# Patient Record
Sex: MALE | Race: BLACK OR AFRICAN AMERICAN | Employment: PART TIME | URBAN - METROPOLITAN AREA
[De-identification: names, ages, dates, MRNs, and addresses within clinical notes are randomized per-mention and may not be internally consistent; named-entity substitution may affect disease eponyms.]

---

## 2021-05-13 ENCOUNTER — HOSPITAL ENCOUNTER (EMERGENCY)
Age: 31
Discharge: HOME | End: 2021-05-13
Admitting: EMERGENCY MEDICINE
Payer: COMMERCIAL

## 2021-05-13 ENCOUNTER — APPOINTMENT (OUTPATIENT)
Dept: CT IMAGING | Age: 31
End: 2021-05-13
Attending: NURSE PRACTITIONER
Payer: COMMERCIAL

## 2021-05-13 VITALS
DIASTOLIC BLOOD PRESSURE: 75 MMHG | SYSTOLIC BLOOD PRESSURE: 130 MMHG | BODY MASS INDEX: 35.55 KG/M2 | WEIGHT: 240 LBS | HEIGHT: 69 IN | HEART RATE: 62 BPM | OXYGEN SATURATION: 97 % | RESPIRATION RATE: 16 BRPM | TEMPERATURE: 98.3 F

## 2021-05-13 DIAGNOSIS — L03.311 CELLULITIS OF ABDOMINAL WALL: Primary | ICD-10-CM

## 2021-05-13 LAB
ALBUMIN SERPL-MCNC: 3.9 G/DL (ref 3.5–5)
ALBUMIN/GLOB SERPL: 0.9 {RATIO} (ref 1.1–2.2)
ALP SERPL-CCNC: 128 U/L (ref 45–117)
ALT SERPL-CCNC: 60 U/L (ref 12–78)
ANION GAP SERPL CALC-SCNC: 4 MMOL/L (ref 5–15)
AST SERPL W P-5'-P-CCNC: 28 U/L (ref 15–37)
BASOPHILS # BLD: 0 K/UL (ref 0–0.1)
BASOPHILS NFR BLD: 0 % (ref 0–1)
BILIRUB SERPL-MCNC: 0.8 MG/DL (ref 0.2–1)
BUN SERPL-MCNC: 15 MG/DL (ref 6–20)
BUN/CREAT SERPL: 13 (ref 12–20)
CA-I BLD-MCNC: 9.3 MG/DL (ref 8.5–10.1)
CHLORIDE SERPL-SCNC: 101 MMOL/L (ref 97–108)
CO2 SERPL-SCNC: 30 MMOL/L (ref 21–32)
CREAT SERPL-MCNC: 1.12 MG/DL (ref 0.7–1.3)
DIFFERENTIAL METHOD BLD: NORMAL
EOSINOPHIL # BLD: 0.3 K/UL (ref 0–0.4)
EOSINOPHIL NFR BLD: 5 % (ref 0–7)
ERYTHROCYTE [DISTWIDTH] IN BLOOD BY AUTOMATED COUNT: 12.6 % (ref 11.5–14.5)
GLOBULIN SER CALC-MCNC: 4.4 G/DL (ref 2–4)
GLUCOSE SERPL-MCNC: 126 MG/DL (ref 65–100)
HCT VFR BLD AUTO: 49.4 % (ref 36.6–50.3)
HGB BLD-MCNC: 16.6 G/DL (ref 12.1–17)
IMM GRANULOCYTES # BLD AUTO: 0 K/UL (ref 0–0.04)
IMM GRANULOCYTES NFR BLD AUTO: 0 % (ref 0–0.5)
LYMPHOCYTES # BLD: 2.2 K/UL (ref 0.8–3.5)
LYMPHOCYTES NFR BLD: 33 % (ref 12–49)
MCH RBC QN AUTO: 30.6 PG (ref 26–34)
MCHC RBC AUTO-ENTMCNC: 33.6 G/DL (ref 30–36.5)
MCV RBC AUTO: 91 FL (ref 80–99)
MONOCYTES # BLD: 0.6 K/UL (ref 0–1)
MONOCYTES NFR BLD: 10 % (ref 5–13)
NEUTS SEG # BLD: 3.5 K/UL (ref 1.8–8)
NEUTS SEG NFR BLD: 52 % (ref 32–75)
NRBC # BLD: 0 K/UL (ref 0–0.01)
NRBC BLD-RTO: 0 PER 100 WBC
PLATELET # BLD AUTO: 333 K/UL (ref 150–400)
PMV BLD AUTO: 10 FL (ref 8.9–12.9)
POTASSIUM SERPL-SCNC: 3.6 MMOL/L (ref 3.5–5.1)
PROT SERPL-MCNC: 8.3 G/DL (ref 6.4–8.2)
RBC # BLD AUTO: 5.43 M/UL (ref 4.1–5.7)
SODIUM SERPL-SCNC: 135 MMOL/L (ref 136–145)
WBC # BLD AUTO: 6.6 K/UL (ref 4.1–11.1)

## 2021-05-13 PROCEDURE — 36415 COLL VENOUS BLD VENIPUNCTURE: CPT

## 2021-05-13 PROCEDURE — 74177 CT ABD & PELVIS W/CONTRAST: CPT

## 2021-05-13 PROCEDURE — 74011000636 HC RX REV CODE- 636: Performed by: NURSE PRACTITIONER

## 2021-05-13 PROCEDURE — 80053 COMPREHEN METABOLIC PANEL: CPT

## 2021-05-13 PROCEDURE — 74011250637 HC RX REV CODE- 250/637: Performed by: NURSE PRACTITIONER

## 2021-05-13 PROCEDURE — 85025 COMPLETE CBC W/AUTO DIFF WBC: CPT

## 2021-05-13 PROCEDURE — 99284 EMERGENCY DEPT VISIT MOD MDM: CPT

## 2021-05-13 RX ORDER — SULFAMETHOXAZOLE AND TRIMETHOPRIM 800; 160 MG/1; MG/1
1 TABLET ORAL
Status: COMPLETED | OUTPATIENT
Start: 2021-05-13 | End: 2021-05-13

## 2021-05-13 RX ORDER — SULFAMETHOXAZOLE AND TRIMETHOPRIM 800; 160 MG/1; MG/1
1 TABLET ORAL 2 TIMES DAILY
Qty: 20 TAB | Refills: 0 | Status: SHIPPED | OUTPATIENT
Start: 2021-05-13 | End: 2021-05-23

## 2021-05-13 RX ADMIN — SULFAMETHOXAZOLE AND TRIMETHOPRIM 1 TABLET: 800; 160 TABLET ORAL at 21:36

## 2021-05-13 RX ADMIN — IOPAMIDOL 100 ML: 755 INJECTION, SOLUTION INTRAVENOUS at 20:29

## 2021-05-13 NOTE — ED TRIAGE NOTES
[facility] pt stated that he has been having lower ABD pain;  Hx of hernia repair and ABD cellulitis; denies any n/v/d/c

## 2021-05-13 NOTE — ED PROVIDER NOTES
EMERGENCY DEPARTMENT HISTORY AND PHYSICAL EXAM      Date: 5/13/2021  Patient Name: Adam Alvarado    History of Presenting Illness     Chief Complaint   Patient presents with    Abdominal Pain       History Provided By: Patient    HPI: Adam Alvarado, 35 y.o. male with a past medical history significant for hernia repair presents to the ED with cc of abdominal pain. Patient reports mid abdominal tenderness at the site of his previous hernia repair onset today. He describes the pain as moderate burning-like, 6/10 presently. He is not taking anything for his symptoms and denies needing any medications at this time. He denies any associated fever/chills, N/V/D/C or any  symptoms. He denies any recent heavy lifting or straining. He states he was seen at the [facility] clinic today, the providers attempted to reduce his hernia and were unsuccessful and sent him here for imaging. There are no other complaints, changes, or physical findings at this time. PCP: None    No current facility-administered medications on file prior to encounter. No current outpatient medications on file prior to encounter. Past History     Past Medical History:  No past medical history on file. Past Surgical History:  No past surgical history on file. Family History:  No family history on file. Social History:  Social History     Tobacco Use    Smoking status: Not on file   Substance Use Topics    Alcohol use: Not on file    Drug use: Not on file       Allergies:  No Known Allergies      Review of Systems     Review of Systems   Constitutional: Negative for chills and fever. Gastrointestinal: Positive for abdominal distention and abdominal pain. Negative for constipation, diarrhea, nausea and vomiting. Genitourinary: Negative. Negative for difficulty urinating, dysuria, scrotal swelling and testicular pain. All other systems reviewed and are negative.       Physical Exam     Physical Exam  Vitals signs and nursing note reviewed. Constitutional:       General: He is not in acute distress. Appearance: Normal appearance. Eyes:      Extraocular Movements: Extraocular movements intact. Conjunctiva/sclera: Conjunctivae normal.   Cardiovascular:      Rate and Rhythm: Normal rate and regular rhythm. Heart sounds: Normal heart sounds. Pulmonary:      Effort: Pulmonary effort is normal.      Breath sounds: Normal breath sounds. No wheezing or rales. Abdominal:      General: Bowel sounds are decreased. There is distension. Tenderness: There is abdominal tenderness in the periumbilical area. Hernia: A hernia is present. Hernia is present in the umbilical area. Comments: Distention with tenderness at old surgical scar/hernia, left umbilical   Musculoskeletal: Normal range of motion. Skin:     General: Skin is warm and dry. Neurological:      General: No focal deficit present. Mental Status: He is alert. Psychiatric:         Mood and Affect: Mood normal.         Behavior: Behavior normal. Behavior is cooperative. Lab and Diagnostic Study Results     Labs -  Admission on 05/13/2021, Discharged on 05/13/2021   Component Date Value    WBC 05/13/2021 6.6     RBC 05/13/2021 5.43     HGB 05/13/2021 16.6     HCT 05/13/2021 49.4     MCV 05/13/2021 91.0     MCH 05/13/2021 30.6     MCHC 05/13/2021 33.6     RDW 05/13/2021 12.6     PLATELET 39/66/2016 296     MPV 05/13/2021 10.0     NRBC 05/13/2021 0.0     ABSOLUTE NRBC 05/13/2021 0.00     NEUTROPHILS 05/13/2021 52     LYMPHOCYTES 05/13/2021 33     MONOCYTES 05/13/2021 10     EOSINOPHILS 05/13/2021 5     BASOPHILS 05/13/2021 0     IMMATURE GRANULOCYTES 05/13/2021 0     ABS. NEUTROPHILS 05/13/2021 3.5     ABS. LYMPHOCYTES 05/13/2021 2.2     ABS. MONOCYTES 05/13/2021 0.6     ABS. EOSINOPHILS 05/13/2021 0.3     ABS. BASOPHILS 05/13/2021 0.0     ABS. IMM.  GRANS. 05/13/2021 0.0     DF 05/13/2021 AUTOMATED  Sodium 05/13/2021 135*    Potassium 05/13/2021 3.6     Chloride 05/13/2021 101     CO2 05/13/2021 30     Anion gap 05/13/2021 4*    Glucose 05/13/2021 126*    BUN 05/13/2021 15     Creatinine 05/13/2021 1.12     BUN/Creatinine ratio 05/13/2021 13     GFR est AA 05/13/2021 >60     GFR est non-AA 05/13/2021 >60     Calcium 05/13/2021 9.3     Bilirubin, total 05/13/2021 0.8     AST (SGOT) 05/13/2021 28     ALT (SGPT) 05/13/2021 60     Alk. phosphatase 05/13/2021 128*    Protein, total 05/13/2021 8.3*    Albumin 05/13/2021 3.9     Globulin 05/13/2021 4.4*    A-G Ratio 05/13/2021 0.9*       Radiologic Studies -   CT Results (most recent):  Results from [place] encounter on 05/13/21   CT ABD PELV W CONT    Narrative CT examination of the abdomen and pelvis is performed with IV contrast. The exam  is a volume acquisition from which axial, sagittal and coronal images are  generated. Comparison exams are not available. Findings:Lung bases are normal. The solid abdominal organs reveal a small cyst  in the posterior segment of the right hepatic lobe. There is no free  intraperitoneal fluid or gas. The bowel is unopacified but normal in caliber. There is a normal appendix in the right lower quadrant. No fluid or mass is seen  in the cul-de-sac. There is no abdominal wall hernia. However, the skin and subcutaneous tissues of  the umbilicus devastated abnormal soft tissue attenuation with perhaps mild  enhancement. This area of presumed cellulitis measures 2.4 x 6.5 x 4 cm. No  definite fluid collection is identified. The bone windows are unremarkable. Impression 1. Postsurgical changes with probable cellulitis around the umbilicus with no  definite abscess or fluid collection. 2. No evidence of abdominal hernia. 3. No acute intra-abdominal findings.         Dose reduction: All CT scans at this facility are performed using radiation dose  reduction optimization techniques as appropriate to a performed exam, including  the following: Automated exposure control (AEC), adjustment of the MAA and/or  KUB according to the patient's size, or the patient's use of iterative  reconstruction techniques (ASIR). Medical Decision Making   - I am the first provider for this patient. - I reviewed the vital signs, available nursing notes, past medical history, past surgical history, family history and social history. - Initial assessment performed. The patients presenting problems have been discussed, and they are in agreement with the care plan formulated and outlined with them. I have encouraged them to ask questions as they arise throughout their visit. Vital Signs-Reviewed the patient's vital signs. See chart    Records Reviewed: Nursing Notes    The patient presents with abdominal pain with a differential diagnosis of abdominal pain, diverticulitis, constipation, incarcerated hernia, intra-abdominal infection      ED Course:   Patient presents with abdominal distention and tenderness at umbilical hernia surgical site. Attempted to reduce hernia without success. CT abdomen obtained showing postsurgical changes with cellulitis around the umbilicus, no abscess or fluid collection, no hernia. Patient reports a similar history of the same proximally 1 year ago. Vital signs normalafebrile. Labs unremarkable, no leukocytosis. Will discharge with Bactrim. Patient recommended to follow-up with general surgeon for repeated cellulitis with postsurgical changes. Patient plans to follow-up with his surgeon in [place] when he returns home in approximately 4 weeks. Discussed worrisome reasons to return to the department including worsening symptoms, pain or fever.   ED Course as of May 16 0155   Thu May 13, 2021   2110 CT ABD PELV W CONT [LP]   2115 Patient updated on results    [LP]      ED Course User Index  [LP] Titus Chaudhry NP       Provider Notes (Medical Decision Making): MDM  Number of Diagnoses or Management Options  Cellulitis of abdominal wall: established, worsening     Amount and/or Complexity of Data Reviewed  Clinical lab tests: ordered and reviewed  Tests in the radiology section of CPT®: ordered and reviewed  Discuss the patient with other providers: yes  Independent visualization of images, tracings, or specimens: yes    Risk of Complications, Morbidity, and/or Mortality  Presenting problems: moderate  Diagnostic procedures: moderate  Management options: low    Patient Progress  Patient progress: stable         Disposition   Disposition: Condition stable  DC-The patient was given verbal abdominal pain warning signs and and follow-up instructions  DC- Pain Control DC Home plan: Nonsteroidals, Tylenol, Referral General Surgery and Advised to return for worsening or additional problems such as abdominal or chest pain    Discharged    DISCHARGE PLAN:  1. There are no discharge medications for this patient. 2.   Follow-up Information     Follow up With Specialties Details Why Contact Info    Your surgeon  Schedule an appointment as soon as possible for a visit  for ER follow up         3. Return to ED if worse   4. Discharge Medication List as of 5/13/2021  9:40 PM      START taking these medications    Details   trimethoprim-sulfamethoxazole (Bactrim DS) 160-800 mg per tablet Take 1 Tab by mouth two (2) times a day for 10 days. , Normal, Disp-20 Tab, R-0               Diagnosis     Clinical Impression:   1. Cellulitis of abdominal wall        Attestations:    Heather Moreno, GREGORIO    Please note that this dictation was completed with KOWN, the computer voice recognition software. Quite often unanticipated grammatical, syntax, homophones, and other interpretive errors are inadvertently transcribed by the computer software. Please disregard these errors. Please excuse any errors that have escaped final proofreading. Thank you.

## 2021-05-14 NOTE — DISCHARGE INSTRUCTIONS
Thank you! Thank you for allowing me to care for you in the emergency department. I sincerely hope that you are satisfied with your visit today. It is my goal to provide you with excellent care. Below you will find a list of your labs and imaging from your visit today. Should you have any questions regarding these results please do not hesitate to call the emergency department. Labs -     Recent Results (from the past 12 hour(s))   CBC WITH AUTOMATED DIFF    Collection Time: 05/13/21  7:15 PM   Result Value Ref Range    WBC 6.6 4.1 - 11.1 K/uL    RBC 5.43 4.10 - 5.70 M/uL    HGB 16.6 12.1 - 17.0 g/dL    HCT 49.4 36.6 - 50.3 %    MCV 91.0 80.0 - 99.0 FL    MCH 30.6 26.0 - 34.0 PG    MCHC 33.6 30.0 - 36.5 g/dL    RDW 12.6 11.5 - 14.5 %    PLATELET 022 360 - 107 K/uL    MPV 10.0 8.9 - 12.9 FL    NRBC 0.0 0.0  WBC    ABSOLUTE NRBC 0.00 0.00 - 0.01 K/uL    NEUTROPHILS 52 32 - 75 %    LYMPHOCYTES 33 12 - 49 %    MONOCYTES 10 5 - 13 %    EOSINOPHILS 5 0 - 7 %    BASOPHILS 0 0 - 1 %    IMMATURE GRANULOCYTES 0 0 - 0.5 %    ABS. NEUTROPHILS 3.5 1.8 - 8.0 K/UL    ABS. LYMPHOCYTES 2.2 0.8 - 3.5 K/UL    ABS. MONOCYTES 0.6 0.0 - 1.0 K/UL    ABS. EOSINOPHILS 0.3 0.0 - 0.4 K/UL    ABS. BASOPHILS 0.0 0.0 - 0.1 K/UL    ABS. IMM. GRANS. 0.0 0.00 - 0.04 K/UL    DF AUTOMATED     METABOLIC PANEL, COMPREHENSIVE    Collection Time: 05/13/21  7:15 PM   Result Value Ref Range    Sodium 135 (L) 136 - 145 mmol/L    Potassium 3.6 3.5 - 5.1 mmol/L    Chloride 101 97 - 108 mmol/L    CO2 30 21 - 32 mmol/L    Anion gap 4 (L) 5 - 15 mmol/L    Glucose 126 (H) 65 - 100 mg/dL    BUN 15 6 - 20 mg/dL    Creatinine 1.12 0.70 - 1.30 mg/dL    BUN/Creatinine ratio 13 12 - 20      GFR est AA >60 >60 ml/min/1.73m2    GFR est non-AA >60 >60 ml/min/1.73m2    Calcium 9.3 8.5 - 10.1 mg/dL    Bilirubin, total 0.8 0.2 - 1.0 mg/dL    AST (SGOT) 28 15 - 37 U/L    ALT (SGPT) 60 12 - 78 U/L    Alk.  phosphatase 128 (H) 45 - 117 U/L    Protein, total 8.3 (H) 6.4 - 8.2 g/dL    Albumin 3.9 3.5 - 5.0 g/dL    Globulin 4.4 (H) 2.0 - 4.0 g/dL    A-G Ratio 0.9 (L) 1.1 - 2.2         Radiologic Studies -   CT ABD PELV W CONT   Final Result   1. Postsurgical changes with probable cellulitis around the umbilicus with no   definite abscess or fluid collection. 2. No evidence of abdominal hernia. 3. No acute intra-abdominal findings. Dose reduction: All CT scans at this facility are performed using radiation dose   reduction optimization techniques as appropriate to a performed exam, including   the following: Automated exposure control (AEC), adjustment of the MAA and/or   KUB according to the patient's size, or the patient's use of iterative   reconstruction techniques (ASIR). CT Results  (Last 48 hours)                 05/13/21 2051  CT ABD PELV W CONT Final result    Impression:  1. Postsurgical changes with probable cellulitis around the umbilicus with no   definite abscess or fluid collection. 2. No evidence of abdominal hernia. 3. No acute intra-abdominal findings. Dose reduction: All CT scans at this facility are performed using radiation dose   reduction optimization techniques as appropriate to a performed exam, including   the following: Automated exposure control (AEC), adjustment of the MAA and/or   KUB according to the patient's size, or the patient's use of iterative   reconstruction techniques (ASIR). Narrative:  CT examination of the abdomen and pelvis is performed with IV contrast. The exam   is a volume acquisition from which axial, sagittal and coronal images are   generated. Comparison exams are not available. Findings:Lung bases are normal. The solid abdominal organs reveal a small cyst   in the posterior segment of the right hepatic lobe. There is no free   intraperitoneal fluid or gas. The bowel is unopacified but normal in caliber. There is a normal appendix in the right lower quadrant.  No fluid or mass is seen   in the cul-de-sac. There is no abdominal wall hernia. However, the skin and subcutaneous tissues of   the umbilicus devastated abnormal soft tissue attenuation with perhaps mild   enhancement. This area of presumed cellulitis measures 2.4 x 6.5 x 4 cm. No   definite fluid collection is identified. The bone windows are unremarkable. CXR Results  (Last 48 hours)      None               If you feel that you have not received excellent quality care or timely care, please ask to speak to the nurse manager. Please choose us in the future for your continued health care needs. ------------------------------------------------------------------------------------------------------------  The exam and treatment you received in the Emergency Department were for an urgent problem and are not intended as complete care. It is important that you follow-up with a doctor, nurse practitioner, or physician assistant to:  (1) confirm your diagnosis,  (2) re-evaluation of changes in your illness and treatment, and  (3) for ongoing care. If your symptoms become worse or you do not improve as expected and you are unable to reach your usual health care provider, you should return to the Emergency Department. We are available 24 hours a day. Please take your discharge instructions with you when you go to your follow-up appointment. If you have any problem arranging a follow-up appointment, contact the Emergency Department immediately. If a prescription has been provided, please have it filled as soon as possible to prevent a delay in treatment. Read the entire medication instruction sheet provided to you by the pharmacy. If you have any questions or reservations about taking the medication due to side effects or interactions with other medications, please call your primary care physician or contact the ER to speak with the charge nurse.      Make an appointment with your family doctor or the physician you were referred to for follow-up of this visit as instructed on your discharge paperwork, as this is a mandatory follow-up. Return to the ER if you are unable to be seen or if you are unable to be seen in a timely manner. If you have any problem arranging the follow-up visit, contact the Emergency Department immediately.